# Patient Record
Sex: MALE | URBAN - METROPOLITAN AREA
[De-identification: names, ages, dates, MRNs, and addresses within clinical notes are randomized per-mention and may not be internally consistent; named-entity substitution may affect disease eponyms.]

---

## 2020-11-13 ENCOUNTER — APPOINTMENT (RX ONLY)
Dept: URBAN - METROPOLITAN AREA CLINIC 55 | Facility: CLINIC | Age: 65
Setting detail: DERMATOLOGY
End: 2020-11-13

## 2020-11-13 DIAGNOSIS — Z41.9 ENCOUNTER FOR PROCEDURE FOR PURPOSES OTHER THAN REMEDYING HEALTH STATE, UNSPECIFIED: ICD-10-CM

## 2020-11-13 PROCEDURE — ? ORDER TESTS

## 2021-04-12 ENCOUNTER — APPOINTMENT (RX ONLY)
Dept: URBAN - METROPOLITAN AREA CLINIC 55 | Facility: CLINIC | Age: 66
Setting detail: DERMATOLOGY
End: 2021-04-12

## 2021-04-12 DIAGNOSIS — Z41.9 ENCOUNTER FOR PROCEDURE FOR PURPOSES OTHER THAN REMEDYING HEALTH STATE, UNSPECIFIED: ICD-10-CM

## 2021-04-12 PROCEDURE — ? SUBCUTANEOUS HORMONE PELLET IMPLANTATION

## 2021-04-12 ASSESSMENT — LOCATION ZONE DERM: LOCATION ZONE: TRUNK

## 2021-04-12 ASSESSMENT — LOCATION DETAILED DESCRIPTION DERM: LOCATION DETAILED: LEFT BUTTOCK

## 2021-04-12 ASSESSMENT — LOCATION SIMPLE DESCRIPTION DERM: LOCATION SIMPLE: LEFT BUTTOCK

## 2021-04-12 NOTE — PROCEDURE: SUBCUTANEOUS HORMONE PELLET IMPLANTATION
Anesthesia Text (1% Lidocaine With Epinephrine (1.5cc)......): was injected subdermally 2 inches in a horizontal fashion to achieve local anesthesia.
Number Of Testosterone Pellets: 10
Anesthesia Volume In Cc: 11
Price (Use Numbers Only, No Special Characters Or $): 914
Hide Expiration Date: No
Testosterone Expiration Date (Optional): 1/2/2022
Testosterone Mg (Optional): 2000
Pellet Placement Text (Pellets Of Testosterone.....Xxx): were then placed into the trocar and slid into place under the skin, approximately 1.5 inches from the incision without difficulty. The trocar was then withdrawn and the wound was closed with steri-strips and a dressing was applied. The patient applied pressure to the wound for 4 minutes. There was less than 1cc of blood loss during the case. The patient was given a post-op instruction sheet and has been instructed to call us if experiencing any problems.
Post-Care Instructions: I reviewed with the patient in detail post-care instructions. Patient understands to call the clinic if there is any redness, swelling or pain. A detailed post-insertion hand was reviewed and provided to the patient.
Anesthesia Type: 1% lidocaine with epinephrine and a 1:10 solution of 8.4% sodium bicarbonate
Incision And Trocar Text: After ensuring adequate anesthesia, a 4mm stab incision was made. A 3.5mm trocar was introduced through the incision into the subcutaneous fat coursing horizontally toward the hip. The stylus was then removed.
Testosterone Lot Number (Optional): [id number]
Detail Level: Detailed
Pre-Operative Text: The patient was placed in a lateral recumbent position. The skin 2-3 inches below the waistline in the upper outer quadrant of the buttocks was cleansed with alcohol. See diagram.

## 2021-09-07 ENCOUNTER — APPOINTMENT (RX ONLY)
Dept: URBAN - METROPOLITAN AREA CLINIC 55 | Facility: CLINIC | Age: 66
Setting detail: DERMATOLOGY
End: 2021-09-07

## 2021-09-07 DIAGNOSIS — Z41.9 ENCOUNTER FOR PROCEDURE FOR PURPOSES OTHER THAN REMEDYING HEALTH STATE, UNSPECIFIED: ICD-10-CM

## 2021-09-07 PROCEDURE — ? SUBCUTANEOUS HORMONE PELLET IMPLANTATION

## 2021-09-07 ASSESSMENT — LOCATION SIMPLE DESCRIPTION DERM: LOCATION SIMPLE: RIGHT BUTTOCK

## 2021-09-07 ASSESSMENT — LOCATION ZONE DERM: LOCATION ZONE: TRUNK

## 2021-09-07 ASSESSMENT — LOCATION DETAILED DESCRIPTION DERM: LOCATION DETAILED: RIGHT BUTTOCK

## 2021-09-07 NOTE — PROCEDURE: SUBCUTANEOUS HORMONE PELLET IMPLANTATION
Anesthesia Text (1% Lidocaine With Epinephrine (1.5cc)......): was injected subdermally 2 inches in a horizontal fashion to achieve local anesthesia.
Detail Level: Detailed
Incision And Trocar Text: After ensuring adequate anesthesia, a 4mm stab incision was made. A 3.5mm trocar was introduced through the incision into the subcutaneous fat coursing horizontally toward the hip. The stylus was then removed.
Pre-Operative Text: The patient was placed in a lateral recumbent position. The skin 2-3 inches below the waistline in the upper outer quadrant of the buttocks was cleansed with alcohol. See diagram.
Brandon Lot Number: No
Price (Use Numbers Only, No Special Characters Or $): 650.00
Anesthesia Volume In Cc: 11
Testosterone Lot Number (Optional): [id number]
Post-Care Instructions: I reviewed with the patient in detail post-care instructions. Patient understands to call the clinic if there is any redness, swelling or pain. A detailed post-insertion hand was reviewed and provided to the patient.
Testosterone Expiration Date (Optional): 06/06/22
Number Of Testosterone Pellets: 10
Anesthesia Type: 1% lidocaine with epinephrine and a 1:10 solution of 8.4% sodium bicarbonate
Pellet Placement Text (Pellets Of Testosterone.....Xxx): were then placed into the trocar and slid into place under the skin, approximately 1.5 inches from the incision without difficulty. The trocar was then withdrawn and the wound was closed with steri-strips and a dressing was applied. The patient applied pressure to the wound for 4 minutes. There was less than 1cc of blood loss during the case. The patient was given a post-op instruction sheet and has been instructed to call us if experiencing any problems.
Testosterone Mg (Optional): 2000

## 2022-06-13 ENCOUNTER — APPOINTMENT (RX ONLY)
Dept: URBAN - METROPOLITAN AREA CLINIC 55 | Facility: CLINIC | Age: 67
Setting detail: DERMATOLOGY
End: 2022-06-13

## 2022-06-13 DIAGNOSIS — Z41.9 ENCOUNTER FOR PROCEDURE FOR PURPOSES OTHER THAN REMEDYING HEALTH STATE, UNSPECIFIED: ICD-10-CM

## 2022-06-13 PROCEDURE — ? SUBCUTANEOUS HORMONE PELLET IMPLANTATION

## 2022-06-13 ASSESSMENT — LOCATION ZONE DERM: LOCATION ZONE: TRUNK

## 2022-06-13 ASSESSMENT — LOCATION SIMPLE DESCRIPTION DERM: LOCATION SIMPLE: LEFT BUTTOCK

## 2022-06-13 ASSESSMENT — LOCATION DETAILED DESCRIPTION DERM: LOCATION DETAILED: LEFT BUTTOCK

## 2022-06-13 NOTE — PROCEDURE: SUBCUTANEOUS HORMONE PELLET IMPLANTATION
Number Of Testosterone Pellets: 10
Post-Care Instructions: I reviewed with the patient in detail post-care instructions. Patient understands to call the clinic if there is any redness, swelling or pain. A detailed post-insertion hand was reviewed and provided to the patient.
Pre-Operative Text: The patient was placed in a lateral recumbent position. The skin 2-3 inches below the waistline in the upper outer quadrant of the buttocks was cleansed with alcohol. See diagram.
Testosterone Expiration Date (Optional): 12/08/22
Incision And Trocar Text: After ensuring adequate anesthesia, a 4mm stab incision was made. A 3.5mm trocar was introduced through the incision into the subcutaneous fat coursing horizontally toward the hip. The stylus was then removed.
Hide Expiration Date: No
Price (Use Numbers Only, No Special Characters Or $): 770
Anesthesia Type: 1% lidocaine with epinephrine and a 1:10 solution of 8.4% sodium bicarbonate
Testosterone Lot Number (Optional): [id number]
Testosterone Mg (Optional): 2000
Detail Level: Detailed
Pellet Placement Text (Pellets Of Testosterone.....Xxx): were then placed into the trocar and slid into place under the skin, approximately 1.5 inches from the incision without difficulty. The trocar was then withdrawn and the wound was closed with steri-strips and a dressing was applied. The patient applied pressure to the wound for 4 minutes. There was less than 1cc of blood loss during the case. The patient was given a post-op instruction sheet and has been instructed to call us if experiencing any problems.
Anesthesia Volume In Cc: 11
Anesthesia Text (1% Lidocaine With Epinephrine (1.5cc)......): was injected subdermally 2 inches in a horizontal fashion to achieve local anesthesia.

## 2022-07-27 ENCOUNTER — APPOINTMENT (RX ONLY)
Dept: URBAN - METROPOLITAN AREA CLINIC 55 | Facility: CLINIC | Age: 67
Setting detail: DERMATOLOGY
End: 2022-07-27

## 2022-07-27 DIAGNOSIS — Z41.9 ENCOUNTER FOR PROCEDURE FOR PURPOSES OTHER THAN REMEDYING HEALTH STATE, UNSPECIFIED: ICD-10-CM

## 2022-07-27 PROCEDURE — ? LAB REPORTS REVIEWED

## 2022-07-27 NOTE — PROCEDURE: LAB REPORTS REVIEWED
Summarized Lab Results: Reviewed labs results from 7/25/22.\\nRichards PSA has slightly increased to 4.22ng/mL. He reports that he has not been taking the supplement DIM, encouraged the daily oral intake of 300mg QD and plan to recheck the PSA in 6 months or prior to his next insertion. \\n[facility] reports for the last year will be emailed to him along with a lab req to have his PSA drawn. I will have the PSA results faxed to this provider and will follow up with the patient on those results. \\n Chase was made aware that we will be discontinuing our [employer] service, it was recommended to search for a provider on the [employer] website.
Detail Level: Zone